# Patient Record
Sex: FEMALE | Race: WHITE | NOT HISPANIC OR LATINO | URBAN - METROPOLITAN AREA
[De-identification: names, ages, dates, MRNs, and addresses within clinical notes are randomized per-mention and may not be internally consistent; named-entity substitution may affect disease eponyms.]

---

## 2017-04-06 ENCOUNTER — EMERGENCY (EMERGENCY)
Facility: HOSPITAL | Age: 37
LOS: 1 days | Discharge: HOME | End: 2017-04-06
Admitting: EMERGENCY MEDICINE
Payer: COMMERCIAL

## 2017-04-06 VITALS
DIASTOLIC BLOOD PRESSURE: 96 MMHG | HEIGHT: 65 IN | SYSTOLIC BLOOD PRESSURE: 128 MMHG | WEIGHT: 188.94 LBS | TEMPERATURE: 98 F | RESPIRATION RATE: 18 BRPM | HEART RATE: 102 BPM | OXYGEN SATURATION: 97 %

## 2017-04-06 PROCEDURE — 99283 EMERGENCY DEPT VISIT LOW MDM: CPT

## 2017-04-06 RX ORDER — KETOROLAC TROMETHAMINE 30 MG/ML
30 SYRINGE (ML) INJECTION ONCE
Qty: 0 | Refills: 0 | Status: DISCONTINUED | OUTPATIENT
Start: 2017-04-06 | End: 2017-04-06

## 2017-04-06 RX ORDER — DIAZEPAM 5 MG
1 TABLET ORAL
Qty: 9 | Refills: 0 | OUTPATIENT
Start: 2017-04-06 | End: 2017-04-09

## 2017-04-06 RX ADMIN — Medication 30 MILLIGRAM(S): at 10:03

## 2017-04-06 RX ADMIN — Medication 30 MILLIGRAM(S): at 09:48

## 2017-04-06 NOTE — ED PROVIDER NOTE - PHYSICAL EXAMINATION
back- no midline tenderness. + b/l paraspainal tenderness and spasm in thoracic area. straight leg raise neg b/l, 5/5 muscle strength in all 4 ext, distal sensation intact

## 2017-04-06 NOTE — ED ADULT TRIAGE NOTE - CHIEF COMPLAINT QUOTE
pt reports she was opening front door and "my lower back went out" worse pain with breathing and moving arms.denies any urination complaints. denies any other complaints. denies any medical history

## 2017-04-06 NOTE — ED PROVIDER NOTE - OBJECTIVE STATEMENT
38 y/o female no pmh c/o mid back pain x today. Pt admits to opening a door and feeling a sharp pain in b/l mid back. Pt admits to similar symptoms in the past and was dx with herniated disk. Pt admits to worsening pain with movement and breathing, did not try OTC meds yet. Pt denies chest pain, sob, abd pain, n/v/d, dysuria, hematuria, bowel or bladder incontinence, numbness, tingling, weakness, dizziness, syncope, fever or chills.

## 2017-11-30 ENCOUNTER — APPOINTMENT (OUTPATIENT)
Dept: ORTHOPEDIC SURGERY | Facility: CLINIC | Age: 37
End: 2017-11-30
Payer: COMMERCIAL

## 2017-11-30 VITALS — HEART RATE: 87 BPM | SYSTOLIC BLOOD PRESSURE: 149 MMHG | DIASTOLIC BLOOD PRESSURE: 92 MMHG

## 2017-11-30 VITALS — HEIGHT: 65 IN | WEIGHT: 185 LBS | BODY MASS INDEX: 30.82 KG/M2

## 2017-11-30 DIAGNOSIS — M54.2 CERVICALGIA: ICD-10-CM

## 2017-11-30 DIAGNOSIS — M54.5 LOW BACK PAIN: ICD-10-CM

## 2017-11-30 DIAGNOSIS — Z78.9 OTHER SPECIFIED HEALTH STATUS: ICD-10-CM

## 2017-11-30 DIAGNOSIS — R55 SYNCOPE AND COLLAPSE: ICD-10-CM

## 2017-11-30 DIAGNOSIS — Z87.891 PERSONAL HISTORY OF NICOTINE DEPENDENCE: ICD-10-CM

## 2017-11-30 PROCEDURE — 99204 OFFICE O/P NEW MOD 45 MIN: CPT

## 2017-11-30 PROCEDURE — 72100 X-RAY EXAM L-S SPINE 2/3 VWS: CPT

## 2017-11-30 PROCEDURE — 72040 X-RAY EXAM NECK SPINE 2-3 VW: CPT

## 2020-01-18 ENCOUNTER — APPOINTMENT (OUTPATIENT)
Dept: ORTHOPEDIC SURGERY | Facility: CLINIC | Age: 40
End: 2020-01-18
Payer: COMMERCIAL

## 2020-01-18 VITALS
BODY MASS INDEX: 30.82 KG/M2 | HEIGHT: 65 IN | HEART RATE: 84 BPM | WEIGHT: 185 LBS | DIASTOLIC BLOOD PRESSURE: 93 MMHG | SYSTOLIC BLOOD PRESSURE: 143 MMHG

## 2020-01-18 DIAGNOSIS — M76.891 OTHER SPECIFIED ENTHESOPATHIES OF RIGHT LOWER LIMB, EXCLUDING FOOT: ICD-10-CM

## 2020-01-18 DIAGNOSIS — M76.892 OTHER SPECIFIED ENTHESOPATHIES OF LEFT LOWER LIMB, EXCLUDING FOOT: ICD-10-CM

## 2020-01-18 PROCEDURE — 73562 X-RAY EXAM OF KNEE 3: CPT | Mod: LT

## 2020-01-18 PROCEDURE — 99204 OFFICE O/P NEW MOD 45 MIN: CPT

## 2020-01-18 NOTE — HISTORY OF PRESENT ILLNESS
[Pain Location] : pain [] : right & left knee [de-identified] : 01/18/2020: The patient is a 39-year-old female who presents to the office today with bilateral anterior knee pain. The pain is located over the quadriceps tendon. It is dull and shooting in nature and worse with going up and down stairs. It radiates down the lateral aspect of both knees. She notes that she moved into a new home right before Christmas and began having pain around [place]. The pain is improving slowly over the past few weeks. She has not taken any medications but used heat which gave her relief.

## 2020-01-18 NOTE — PHYSICAL EXAM
[ALL] : Biceps, brachioradialis, triceps, patellar, ankle and plantar 2+ and symmetric bilaterally [UE/LE] : Sensory: Intact in bilateral upper & lower extremities [Normal Finger/nose] : finger to nose coordination [Normal] : no peripheral adenopathy appreciated [Acute Distress] : not in acute distress [de-identified] : Bilateral Knee Exam\par \par FROM to hip\par \par Skin Warm, Dry, no erythema, no lesions \par \par Knee \par stable\par anatomic alignment\par ROM 0-130\par Valgus/Varus Stress intact \par Effusion - Negative\par Crepitus - Negative \par Patella Grind - Negative \par Patella Apprehension - Negative \par Medial joint line tenderness - Negative\par Lateral Joint line tenderness - Negative\par Jimmy Test - Negative  \par Lachman test - Negative \par Anterior Drawer Test -  Negative \par Pain at the quad tendons with flexion of the knee against resistance.\par Distal Motor Function intact \par Sensation intact to light touch bilaterally \par Pulses 2+ bilaterally\par  [Poor Appearance] : well-appearing [de-identified] : 3 x-ray views of bilateral knees were obtained. There are no fractures or dislocations noted. [de-identified] : MARYR

## 2020-01-18 NOTE — ASSESSMENT
[FreeTextEntry1] : Patient with bilateral knee pain which is worse on the right than the left. Her symptoms are consistent with quadriceps tendinitis. The nature of this condition was described at length with the patient and she verbalized understanding. I recommend a course of meloxicam for an anti-inflammatory. Should the patient's symptoms not improve over the next few weeks she will return to the office for further treatment options. The patient is in agreement with this plan.

## 2020-01-21 ENCOUNTER — TRANSCRIPTION ENCOUNTER (OUTPATIENT)
Age: 40
End: 2020-01-21

## 2020-01-23 PROBLEM — M76.892 TENDINITIS OF LEFT QUADRICEPS TENDON: Status: ACTIVE | Noted: 2020-01-18

## 2020-01-23 PROBLEM — M76.891 TENDINITIS OF RIGHT QUADRICEPS TENDON: Status: ACTIVE | Noted: 2020-01-18

## 2020-08-12 ENCOUNTER — TRANSCRIPTION ENCOUNTER (OUTPATIENT)
Age: 40
End: 2020-08-12

## 2020-08-18 ENCOUNTER — OUTPATIENT (OUTPATIENT)
Dept: OUTPATIENT SERVICES | Facility: HOSPITAL | Age: 40
LOS: 1 days | End: 2020-08-18

## 2020-08-18 ENCOUNTER — APPOINTMENT (OUTPATIENT)
Dept: ULTRASOUND IMAGING | Facility: CLINIC | Age: 40
End: 2020-08-18
Payer: COMMERCIAL

## 2020-08-18 DIAGNOSIS — Z00.8 ENCOUNTER FOR OTHER GENERAL EXAMINATION: ICD-10-CM

## 2020-08-18 PROCEDURE — 76700 US EXAM ABDOM COMPLETE: CPT | Mod: 26

## 2020-08-24 ENCOUNTER — APPOINTMENT (OUTPATIENT)
Dept: GASTROENTEROLOGY | Facility: CLINIC | Age: 40
End: 2020-08-24
Payer: COMMERCIAL

## 2020-08-24 VITALS
HEART RATE: 80 BPM | TEMPERATURE: 97.7 F | BODY MASS INDEX: 29.82 KG/M2 | WEIGHT: 179 LBS | HEIGHT: 65 IN | DIASTOLIC BLOOD PRESSURE: 90 MMHG | SYSTOLIC BLOOD PRESSURE: 120 MMHG | OXYGEN SATURATION: 97 %

## 2020-08-24 DIAGNOSIS — Z78.9 OTHER SPECIFIED HEALTH STATUS: ICD-10-CM

## 2020-08-24 PROCEDURE — 99204 OFFICE O/P NEW MOD 45 MIN: CPT

## 2020-08-24 RX ORDER — NORETHINDRONE ACETATE/ETHINYL ESTRADIOL 1.5-0.03MG
1.5-3 KIT ORAL
Refills: 0 | Status: COMPLETED | COMMUNITY
End: 2020-08-24

## 2020-08-24 RX ORDER — CYCLOBENZAPRINE HYDROCHLORIDE 7.5 MG/1
TABLET, FILM COATED ORAL
Refills: 0 | Status: COMPLETED | COMMUNITY
End: 2020-08-24

## 2020-08-24 RX ORDER — IBUPROFEN 200 MG/1
200 CAPSULE, LIQUID FILLED ORAL
Refills: 0 | Status: COMPLETED | COMMUNITY
End: 2020-08-24

## 2020-08-24 NOTE — HISTORY OF PRESENT ILLNESS
[de-identified] : This is a 40-year-old woman presenting for evaluation of epigastric pain and chronic constipation.  The patient reports a long history of constipation, moving her bowels every 2-3 days, describes her stools as soft, and reports feeling somewhat incomplete evacuation.  She reports accompanied abdominal distention and bloating.  She has tried taking Metamucil which made no difference.  She recently had an acute viral illness approximately 2 weeks ago.  She had a week of fever with a temperature of 101°F, headaches, myalgias, and diarrhea.  Her last bowel movement was Thursday (today is Monday).  She reports having undergone an EGD and colonoscopy approximately 7 years ago that was notable only for gastritis for which she was treated with AcipHex.  She denies any rectal bleeding but reports that her stools were black while she had diarrhea but denied they had the consistency of being tarry.

## 2020-08-24 NOTE — PHYSICAL EXAM
[General Appearance - Alert] : alert [General Appearance - In No Acute Distress] : in no acute distress [Sclera] : the sclera and conjunctiva were normal [PERRL With Normal Accommodation] : pupils were equal in size, round, and reactive to light [Extraocular Movements] : extraocular movements were intact [Outer Ear] : the ears and nose were normal in appearance [Hearing Threshold Finger Rub Not [place]] : hearing was normal [Neck Appearance] : the appearance of the neck was normal [Neck Cervical Mass (___cm)] : no neck mass was observed [Thyroid Diffuse Enlargement] : the thyroid was not enlarged [Jugular Venous Distention Increased] : there was no jugular-venous distention [Thyroid Nodule] : there were no palpable thyroid nodules [Auscultation Breath Sounds / Voice Sounds] : lungs were clear to auscultation bilaterally [Heart Rate And Rhythm] : heart rate was normal and rhythm regular [Heart Sounds] : normal S1 and S2 [Heart Sounds Gallop] : no gallops [Murmurs] : no murmurs [Heart Sounds Pericardial Friction Rub] : no pericardial rub [Edema] : there was no peripheral edema [Bowel Sounds] : normal bowel sounds [Abdomen Soft] : soft [] : no hepato-splenomegaly [Abdomen Mass (___ Cm)] : no abdominal mass palpated [Epigastric] : in the epigastric area [Cervical Lymph Nodes Enlarged Posterior Bilaterally] : posterior cervical [Cervical Lymph Nodes Enlarged Anterior Bilaterally] : anterior cervical [Supraclavicular Lymph Nodes Enlarged Bilaterally] : supraclavicular [Skin Color & Pigmentation] : normal skin color and pigmentation [Nail Clubbing] : no clubbing  or cyanosis of the fingernails [Skin Turgor] : normal skin turgor [No Focal Deficits] : no focal deficits [Oriented To Time, Place, And Person] : oriented to person, place, and time [Impaired Insight] : insight and judgment were intact [Affect] : the affect was normal

## 2020-08-24 NOTE — ASSESSMENT
[FreeTextEntry1] : Heavy alcohol use: Counseled patient to decrease alcohol use and warned of the risks of cirrhosis and other sequelae of alcohol overuse.\par \par Epigastric pain: Seems multifactorial.  A combination of chronic constipation (IBS-C) and possible gastritis or PUD.  Will start Linzess 145 mcg daily and have her follow up in 6 weeks.  Will also start pantoprazole 40 mg daily and schedule EGD to rule out PUD.\par \par \par Much of the above text was dictated using speech recognition software and was not reviewed for typographical, spelling, or grammatical errors.

## 2020-08-27 ENCOUNTER — TRANSCRIPTION ENCOUNTER (OUTPATIENT)
Age: 40
End: 2020-08-27

## 2020-08-30 ENCOUNTER — TRANSCRIPTION ENCOUNTER (OUTPATIENT)
Age: 40
End: 2020-08-30

## 2020-08-30 RX ORDER — LINACLOTIDE 145 UG/1
145 CAPSULE, GELATIN COATED ORAL
Qty: 30 | Refills: 11 | Status: DISCONTINUED | COMMUNITY
Start: 2020-08-24 | End: 2020-08-30

## 2020-09-28 DIAGNOSIS — Z01.818 ENCOUNTER FOR OTHER PREPROCEDURAL EXAMINATION: ICD-10-CM

## 2020-09-29 ENCOUNTER — APPOINTMENT (OUTPATIENT)
Dept: DISASTER EMERGENCY | Facility: CLINIC | Age: 40
End: 2020-09-29

## 2020-09-30 LAB — SARS-COV-2 N GENE NPH QL NAA+PROBE: NOT DETECTED

## 2020-10-02 ENCOUNTER — OUTPATIENT (OUTPATIENT)
Dept: OUTPATIENT SERVICES | Facility: HOSPITAL | Age: 40
LOS: 1 days | End: 2020-10-02
Payer: COMMERCIAL

## 2020-10-02 ENCOUNTER — APPOINTMENT (OUTPATIENT)
Dept: GASTROENTEROLOGY | Facility: GI CENTER | Age: 40
End: 2020-10-02
Payer: COMMERCIAL

## 2020-10-02 ENCOUNTER — RESULT REVIEW (OUTPATIENT)
Age: 40
End: 2020-10-02

## 2020-10-02 DIAGNOSIS — R10.13 EPIGASTRIC PAIN: ICD-10-CM

## 2020-10-02 DIAGNOSIS — K44.9 DIAPHRAGMATIC HERNIA W/OUT OBSTRUCTION OR GANGRENE: ICD-10-CM

## 2020-10-02 PROCEDURE — 88342 IMHCHEM/IMCYTCHM 1ST ANTB: CPT

## 2020-10-02 PROCEDURE — 88342 IMHCHEM/IMCYTCHM 1ST ANTB: CPT | Mod: 26

## 2020-10-02 PROCEDURE — 88305 TISSUE EXAM BY PATHOLOGIST: CPT | Mod: 26

## 2020-10-02 PROCEDURE — 43239 EGD BIOPSY SINGLE/MULTIPLE: CPT

## 2020-10-02 PROCEDURE — 88305 TISSUE EXAM BY PATHOLOGIST: CPT

## 2020-10-02 NOTE — ASSESSMENT
[FreeTextEntry1] : Exam notable for small hiatal hernia with slightly irregular Z-line.  Diffuse erythematous gastropathy seen in the fundus, corpus and antrum.  Random biopsies taken to rue out H. pylori.  The visualized duodenum was grossly normal in appearance.\par \par Follow up pathology\par Continue PPI

## 2020-10-02 NOTE — PROCEDURE
[With Biopsy] : with biopsy [Gastric Ulcer] : gastric ulcer [GI Bleeding] : GI bleeding [Epigastric Pain] : epigastric pain [Procedure Explained] : The procedure was explained [Allergies Reviewed] : allergies reviewed. [Benefits] : benefits [Alternatives] : alternatives [Consent Obtained] : written consent was obtained prior to the procedure and is detailed in the patient's record [Patient] : the patient [Automated Blood Pressure Cuff] : automated blood pressure cuff [Cardiac Monitor] : cardiac monitor [Pulse Oximeter] : pulse oximeter [2] : 2 [Sedation Clearance] : the patient was cleared for moderate sedation [Performed By: ___] : Performed by:  MARINA [Propofol ___ mg IV] : Propofol [unfilled] ~Umg intravenously [Hiatal Hernia] : hiatal hernia [Erythema] : erythema [Normal] : Normal [Sent to Pathology] : was sent to pathology for analysis [Tolerated Well] : the patient tolerated the procedure well [Vital Signs Stable] : the vital signs were stable [de-identified] : [employer] [id number]  [de-identified] : Irregular Z-line

## 2020-10-02 NOTE — PROCEDURE
[With Biopsy] : with biopsy [Gastric Ulcer] : gastric ulcer [GI Bleeding] : GI bleeding [Epigastric Pain] : epigastric pain [Procedure Explained] : The procedure was explained [Allergies Reviewed] : allergies reviewed. [Benefits] : benefits [Alternatives] : alternatives [Consent Obtained] : written consent was obtained prior to the procedure and is detailed in the patient's record [Patient] : the patient [Automated Blood Pressure Cuff] : automated blood pressure cuff [Cardiac Monitor] : cardiac monitor [Pulse Oximeter] : pulse oximeter [2] : 2 [Sedation Clearance] : the patient was cleared for moderate sedation [Performed By: ___] : Performed by:  MARINA [Propofol ___ mg IV] : Propofol [unfilled] ~Umg intravenously [Hiatal Hernia] : hiatal hernia [Erythema] : erythema [Normal] : Normal [Sent to Pathology] : was sent to pathology for analysis [Tolerated Well] : the patient tolerated the procedure well [Vital Signs Stable] : the vital signs were stable [de-identified] : Olympus [id number]  [de-identified] : Irregular Z-line

## 2020-10-02 NOTE — PHYSICAL EXAM
[General Appearance - Alert] : alert [General Appearance - In No Acute Distress] : in no acute distress [Sclera] : the sclera and conjunctiva were normal [PERRL With Normal Accommodation] : pupils were equal in size, round, and reactive to light [Extraocular Movements] : extraocular movements were intact [Outer Ear] : the ears and nose were normal in appearance [Hearing Threshold Finger Rub Not [place]] : hearing was normal [Neck Appearance] : the appearance of the neck was normal [Neck Cervical Mass (___cm)] : no neck mass was observed [Jugular Venous Distention Increased] : there was no jugular-venous distention [Thyroid Diffuse Enlargement] : the thyroid was not enlarged [Thyroid Nodule] : there were no palpable thyroid nodules [Auscultation Breath Sounds / Voice Sounds] : lungs were clear to auscultation bilaterally [Heart Rate And Rhythm] : heart rate was normal and rhythm regular [Heart Sounds] : normal S1 and S2 [Heart Sounds Gallop] : no gallops [Murmurs] : no murmurs [Heart Sounds Pericardial Friction Rub] : no pericardial rub [Edema] : there was no peripheral edema [Bowel Sounds] : normal bowel sounds [Abdomen Soft] : soft [] : no hepato-splenomegaly [Abdomen Mass (___ Cm)] : no abdominal mass palpated [Epigastric] : in the epigastric area [Cervical Lymph Nodes Enlarged Posterior Bilaterally] : posterior cervical [Cervical Lymph Nodes Enlarged Anterior Bilaterally] : anterior cervical [Supraclavicular Lymph Nodes Enlarged Bilaterally] : supraclavicular [Nail Clubbing] : no clubbing  or cyanosis of the fingernails [Skin Color & Pigmentation] : normal skin color and pigmentation [Skin Turgor] : normal skin turgor [No Focal Deficits] : no focal deficits [Oriented To Time, Place, And Person] : oriented to person, place, and time [Impaired Insight] : insight and judgment were intact [Affect] : the affect was normal

## 2020-10-02 NOTE — PHYSICAL EXAM
[General Appearance - Alert] : alert [General Appearance - In No Acute Distress] : in no acute distress [Sclera] : the sclera and conjunctiva were normal [PERRL With Normal Accommodation] : pupils were equal in size, round, and reactive to light [Extraocular Movements] : extraocular movements were intact [Outer Ear] : the ears and nose were normal in appearance [Hearing Threshold Finger Rub Not [place]] : hearing was normal [Neck Appearance] : the appearance of the neck was normal [Neck Cervical Mass (___cm)] : no neck mass was observed [Jugular Venous Distention Increased] : there was no jugular-venous distention [Thyroid Diffuse Enlargement] : the thyroid was not enlarged [Thyroid Nodule] : there were no palpable thyroid nodules [Auscultation Breath Sounds / Voice Sounds] : lungs were clear to auscultation bilaterally [Heart Rate And Rhythm] : heart rate was normal and rhythm regular [Heart Sounds] : normal S1 and S2 [Heart Sounds Gallop] : no gallops [Murmurs] : no murmurs [Heart Sounds Pericardial Friction Rub] : no pericardial rub [Edema] : there was no peripheral edema [Bowel Sounds] : normal bowel sounds [Abdomen Soft] : soft [] : no hepato-splenomegaly [Abdomen Mass (___ Cm)] : no abdominal mass palpated [Epigastric] : in the epigastric area [Cervical Lymph Nodes Enlarged Posterior Bilaterally] : posterior cervical [Cervical Lymph Nodes Enlarged Anterior Bilaterally] : anterior cervical [Supraclavicular Lymph Nodes Enlarged Bilaterally] : supraclavicular [Nail Clubbing] : no clubbing  or cyanosis of the fingernails [Skin Color & Pigmentation] : normal skin color and pigmentation [Skin Turgor] : normal skin turgor [No Focal Deficits] : no focal deficits [Oriented To Time, Place, And Person] : oriented to person, place, and time [Impaired Insight] : insight and judgment were intact [Affect] : the affect was normal

## 2020-10-06 LAB — SURGICAL PATHOLOGY STUDY: SIGNIFICANT CHANGE UP

## 2020-10-07 ENCOUNTER — TRANSCRIPTION ENCOUNTER (OUTPATIENT)
Age: 40
End: 2020-10-07

## 2020-10-07 ENCOUNTER — APPOINTMENT (OUTPATIENT)
Dept: GASTROENTEROLOGY | Facility: CLINIC | Age: 40
End: 2020-10-07

## 2020-11-02 ENCOUNTER — APPOINTMENT (OUTPATIENT)
Dept: GASTROENTEROLOGY | Facility: CLINIC | Age: 40
End: 2020-11-02
Payer: COMMERCIAL

## 2020-11-02 VITALS
TEMPERATURE: 98 F | HEIGHT: 65 IN | BODY MASS INDEX: 29.32 KG/M2 | OXYGEN SATURATION: 99 % | WEIGHT: 176 LBS | SYSTOLIC BLOOD PRESSURE: 124 MMHG | HEART RATE: 82 BPM | RESPIRATION RATE: 16 BRPM | DIASTOLIC BLOOD PRESSURE: 84 MMHG

## 2020-11-02 DIAGNOSIS — R10.816 EPIGASTRIC ABDOMINAL TENDERNESS: ICD-10-CM

## 2020-11-02 DIAGNOSIS — K58.1 IRRITABLE BOWEL SYNDROME WITH CONSTIPATION: ICD-10-CM

## 2020-11-02 DIAGNOSIS — K29.70 GASTRITIS, UNSPECIFIED, W/OUT BLEEDING: ICD-10-CM

## 2020-11-02 DIAGNOSIS — K22.9 DISEASE OF ESOPHAGUS, UNSPECIFIED: ICD-10-CM

## 2020-11-02 DIAGNOSIS — R10.13 EPIGASTRIC PAIN: ICD-10-CM

## 2020-11-02 PROCEDURE — 99072 ADDL SUPL MATRL&STAF TM PHE: CPT

## 2020-11-02 PROCEDURE — 99214 OFFICE O/P EST MOD 30 MIN: CPT

## 2020-11-02 RX ORDER — PANTOPRAZOLE 40 MG/1
40 TABLET, DELAYED RELEASE ORAL
Qty: 90 | Refills: 3 | Status: ACTIVE | COMMUNITY
Start: 2020-08-24 | End: 1900-01-01

## 2020-11-02 NOTE — HISTORY OF PRESENT ILLNESS
[de-identified] : Patient reports feeling well.  Trulance is working well for her.  Her epigastric is much better.  Wants to come off pantoprazole.  Reviewed EGD and pathology.

## 2020-11-02 NOTE — ASSESSMENT
[FreeTextEntry1] : Patient doing well.  She should continue Trulance and was advised to stop in the event that she becomes pregnant.  Pantoprazole can be continued, but she can also try stopping as well.  \par \par Follow up in 1 year.

## 2020-11-02 NOTE — PHYSICAL EXAM
[General Appearance - Alert] : alert [General Appearance - In No Acute Distress] : in no acute distress [Sclera] : the sclera and conjunctiva were normal [PERRL With Normal Accommodation] : pupils were equal in size, round, and reactive to light [Extraocular Movements] : extraocular movements were intact [Outer Ear] : the ears and nose were normal in appearance [Hearing Threshold Finger Rub Not [place]] : hearing was normal [Neck Appearance] : the appearance of the neck was normal [Neck Cervical Mass (___cm)] : no neck mass was observed [Jugular Venous Distention Increased] : there was no jugular-venous distention [Thyroid Diffuse Enlargement] : the thyroid was not enlarged [Thyroid Nodule] : there were no palpable thyroid nodules [Auscultation Breath Sounds / Voice Sounds] : lungs were clear to auscultation bilaterally [Heart Rate And Rhythm] : heart rate was normal and rhythm regular [Heart Sounds] : normal S1 and S2 [Heart Sounds Gallop] : no gallops [Murmurs] : no murmurs [Heart Sounds Pericardial Friction Rub] : no pericardial rub [Edema] : there was no peripheral edema [Bowel Sounds] : normal bowel sounds [Abdomen Soft] : soft [Abdomen Tenderness] : non-tender [] : no hepato-splenomegaly [Abdomen Mass (___ Cm)] : no abdominal mass palpated [Cervical Lymph Nodes Enlarged Posterior Bilaterally] : posterior cervical [Cervical Lymph Nodes Enlarged Anterior Bilaterally] : anterior cervical [Supraclavicular Lymph Nodes Enlarged Bilaterally] : supraclavicular [Nail Clubbing] : no clubbing  or cyanosis of the fingernails [Skin Color & Pigmentation] : normal skin color and pigmentation [Skin Turgor] : normal skin turgor [No Focal Deficits] : no focal deficits [Oriented To Time, Place, And Person] : oriented to person, place, and time [Impaired Insight] : insight and judgment were intact [Affect] : the affect was normal

## 2020-12-03 ENCOUNTER — RX RENEWAL (OUTPATIENT)
Age: 40
End: 2020-12-03

## 2020-12-03 RX ORDER — MELOXICAM 15 MG/1
15 TABLET ORAL
Qty: 30 | Refills: 0 | Status: ACTIVE | COMMUNITY
Start: 2020-01-18 | End: 1900-01-01

## 2021-08-25 ENCOUNTER — EMERGENCY (EMERGENCY)
Facility: HOSPITAL | Age: 41
LOS: 1 days | Discharge: HOME | End: 2021-08-25
Attending: EMERGENCY MEDICINE
Payer: COMMERCIAL

## 2021-08-25 VITALS
OXYGEN SATURATION: 98 % | TEMPERATURE: 99 F | RESPIRATION RATE: 18 BRPM | SYSTOLIC BLOOD PRESSURE: 146 MMHG | HEART RATE: 84 BPM | DIASTOLIC BLOOD PRESSURE: 92 MMHG | WEIGHT: 180.56 LBS | HEIGHT: 65 IN

## 2021-08-25 PROCEDURE — 99283 EMERGENCY DEPT VISIT LOW MDM: CPT

## 2021-08-25 RX ORDER — KETOTIFEN FUMARATE 0.34 MG/ML
1 SOLUTION OPHTHALMIC ONCE
Refills: 0 | Status: COMPLETED | OUTPATIENT
Start: 2021-08-25 | End: 2021-08-25

## 2021-08-25 RX ORDER — IBUPROFEN 200 MG
600 TABLET ORAL ONCE
Refills: 0 | Status: COMPLETED | OUTPATIENT
Start: 2021-08-25 | End: 2021-08-25

## 2021-08-25 RX ADMIN — KETOTIFEN FUMARATE 1 DROP(S): 0.34 SOLUTION OPHTHALMIC at 10:51

## 2021-08-25 NOTE — ED PROVIDER NOTE - CLINICAL SUMMARY MEDICAL DECISION MAKING FREE TEXT BOX
R eye pain, will medicate for pain, preform eye exam, morgans lense, eye drops, refer to ophto as needed [place]: R eye pain, will medicate for pain, preform eye exam, morgans lens for irrigation, eye drops, refer to ophto for follow up

## 2021-08-25 NOTE — ED ADULT TRIAGE NOTE - CHIEF COMPLAINT QUOTE
pt ambulatory into ED a&ox3, no acute distress, breaths even and unlabored c/o R eye pain and swelling since getting wet dirt in it while gardening. has been continuously washing out eye but is not improving.

## 2021-08-25 NOTE — ED PROVIDER NOTE - NSICDXPASTMEDICALHX_GEN_ALL_CORE_FT
PAST MEDICAL HISTORY:  Anemia     Shoulder dislocation, recurrent, right     Syncope cardiogenic syncope

## 2021-08-25 NOTE — ED ADULT NURSE REASSESSMENT NOTE - NS ED NURSE REASSESS COMMENT FT1
pt hemodynamically stable, refer to flowsheet and chart, pt to be discharged, pt understood discharge instructions and plan of care. Pt medically cleared by JOSEMANUEL Santos.

## 2021-08-25 NOTE — ED PROVIDER NOTE - PATIENT PORTAL LINK FT
You can access the FollowMyHealth Patient Portal offered by [facility] by registering at the following website: [email or web address]. By joining [facility]’s FollowMyHealth portal, you will also be able to view your health information using other applications (apps) compatible with our system.

## 2021-08-25 NOTE — ED PROVIDER NOTE - EYES, MLM
L eye: clear, no injected conjunctiva, no FB noted. both eyes: PERRL, EOMI, R eye: swollen conjunctiva, woods lamp exam no abrasion noted, no jonny sign + excessive tearing

## 2021-08-25 NOTE — ED PROVIDER NOTE - OBJECTIVE STATEMENT
42 yo F Pt, PShx: lasix x 3 yrs, presents to ED complaining of R eye pain x 1 day. Pt states she was moving a planter and dirt spilled into her eyes. PT admits to R eye pain, burning and FB sensation. Pt admits to flushing eyes multiple times yesterday with minimal improvement. Pt denies change in vision, LOC, 42 yo F Pt, PShx: lasix x 3 yrs, presents to ED complaining of R eye pain x 1 day. Pt states she was moving a planter and dirt spilled into her eyes. PT admits to R eye pain, burning and FB sensation. Pt admits to flushing eyes multiple times yesterday with minimal improvement. Pt denies change in vision, LOC, numbness, tingling, and any other acute symptoms at this time.

## 2021-08-25 NOTE — ED PROVIDER NOTE - NSFOLLOWUPINSTRUCTIONS_ED_ALL_ED_FT
1. TAKE ALL MEDICATIONS AS DIRECTED.  FOR PAIN YOU CAN TAKE IBUPROFEN (MOTRIN, ADVIL) OR ACETAMINOPHEN (TYLENOL) AS NEEDED, AS DIRECTED ON PACKAGING.  2. FOLLOW UP WITH __________ AS DIRECTED.  YOU WERE GIVEN COPIES OF ALL LABS AND IMAGING RESULTS FROM YOUR ER VISIT--PLEASE TAKE THEM WITH YOU TO YOUR APPOINTMENT.  3. IF NEEDED, CALL [phone] TO FIND A PRIMARY CARE PHYSICIAN.  OR CALL [phone] TO MAKE AN APPOINTMENT WITH THE MEDICAL CLINIC.  4. RETURN TO THE ER FOR ANY WORSENING SYMPTOMS.

## 2021-08-25 NOTE — ED PROVIDER NOTE - CARE PROVIDER_API CALL
Gurvinder Ho)  Ophthalmology  [address], [address]  [place] [address]  Phone: [phone]  Fax: [phone]  Follow Up Time:

## 2024-06-29 NOTE — ED ADULT TRIAGE NOTE - BP NONINVASIVE SYSTOLIC (MM HG)
From: Karoline Montiel  To: Jocelyn Cui MD  Sent: 2024 10:47 PM CDT  Subject: I took my blood sugars last night it 86.     Not sure how accurate it is my [employer] next test strips  2019. Can you please call some into [facility]. I will be getting the blood work you ordered on Saturday they have walk in lab hours that morning. Thank you Liya Cole  
[phone]